# Patient Record
Sex: FEMALE | Race: OTHER | NOT HISPANIC OR LATINO | URBAN - METROPOLITAN AREA
[De-identification: names, ages, dates, MRNs, and addresses within clinical notes are randomized per-mention and may not be internally consistent; named-entity substitution may affect disease eponyms.]

---

## 2020-08-25 ENCOUNTER — OFFICE VISIT (OUTPATIENT)
Dept: URBAN - METROPOLITAN AREA TELEHEALTH 2 | Facility: TELEHEALTH | Age: 65
End: 2020-08-25
Payer: MEDICARE

## 2020-08-25 DIAGNOSIS — K52.9 CHRONIC DIARRHEA: ICD-10-CM

## 2020-08-25 PROBLEM — 62315008 DIARRHEA: Status: ACTIVE | Noted: 2020-08-25

## 2020-08-25 PROCEDURE — 99214 OFFICE O/P EST MOD 30 MIN: CPT | Performed by: INTERNAL MEDICINE

## 2020-08-25 PROCEDURE — 3017F COLORECTAL CA SCREEN DOC REV: CPT | Performed by: INTERNAL MEDICINE

## 2020-08-25 PROCEDURE — G8427 DOCREV CUR MEDS BY ELIG CLIN: HCPCS | Performed by: INTERNAL MEDICINE

## 2020-08-25 PROCEDURE — G8422 PT INELIG BMI CALCULATION: HCPCS | Performed by: INTERNAL MEDICINE

## 2020-08-25 PROCEDURE — 1036F TOBACCO NON-USER: CPT | Performed by: INTERNAL MEDICINE

## 2020-08-25 PROCEDURE — G9903 PT SCRN TBCO ID AS NON USER: HCPCS | Performed by: INTERNAL MEDICINE

## 2020-08-25 NOTE — HPI-TODAY'S VISIT:
65 yo pt w/ 10 days of persistent diarrhea: loose to watery stools w/o blood nor mucus, 3 to 4 x /d, w/o abdominal pain, some diffuse abdominal tenderness w/ palpation, unrelated to the type of food she eats, and no nocturnal diarrhea. No steatorrhea. Has lost ~ 5 lbs / 2 wks due to decreased po intake. No change in diet and no use of abx's recently. Has been under stress at home lately. No fever or chills and denies constitutional sxs.  Has been on probiotics. She also admits having a bitter taste in her mouth in the am. No nicholas LEXI sxs and no dysphagia / odynophagia. No other complaints afer extensive ROS x for above noted. She had similar episode of diarrhea for some time last year w/ negative evaluation. No recent labs and has been on no PPI.

## 2021-06-22 ENCOUNTER — OFFICE VISIT (OUTPATIENT)
Dept: URBAN - METROPOLITAN AREA TELEHEALTH 2 | Facility: TELEHEALTH | Age: 66
End: 2021-06-22
Payer: MEDICARE

## 2021-06-22 DIAGNOSIS — K21.9 GERD WITHOUT ESOPHAGITIS: ICD-10-CM

## 2021-06-22 PROCEDURE — 99213 OFFICE O/P EST LOW 20 MIN: CPT | Performed by: INTERNAL MEDICINE

## 2021-06-22 RX ORDER — PANTOPRAZOLE SODIUM 40 MG/1
1 TABLET TABLET, DELAYED RELEASE ORAL ONCE A DAY
Qty: 30 | Refills: 3 | OUTPATIENT
Start: 2021-06-22

## 2021-06-22 NOTE — HPI-TODAY'S VISIT:
This is a Telehealth OV to which patient has agreed to. Limitations of telehealth discussed; she understands and agrees to proceed. 64 yo pt w/ increasing LEXI sxs past several weeks. No abdominal pain and no dysphagia / odynophagia. Has been experiencing p-prandial palpitations w/o CP nor SOB. On no RX. No anorexia or weight loss. Normal bm's w/o bleeding nor melenic stools. Some esophageal spasm when taking Omega-3 supplements. No COVID-19 exposure and has received 2 doses of COVID-19 vaccine.

## 2021-12-04 ENCOUNTER — OFFICE VISIT (OUTPATIENT)
Dept: URBAN - METROPOLITAN AREA TELEHEALTH 2 | Facility: TELEHEALTH | Age: 66
End: 2021-12-04
Payer: MEDICARE

## 2021-12-04 DIAGNOSIS — K21.9 GERD WITHOUT ESOPHAGITIS: ICD-10-CM

## 2021-12-04 PROCEDURE — 99214 OFFICE O/P EST MOD 30 MIN: CPT | Performed by: INTERNAL MEDICINE

## 2021-12-04 RX ORDER — LANSOPRAZOLE 30 MG
1 CAPSULE BEFORE A MEAL CAPSULE,DELAYED RELEASE (ENTERIC COATED) ORAL ONCE A DAY
Qty: 30 | Refills: 3 | OUTPATIENT
Start: 2021-12-04

## 2021-12-04 RX ORDER — PANTOPRAZOLE SODIUM 40 MG/1
1 TABLET TABLET, DELAYED RELEASE ORAL ONCE A DAY
Qty: 30 | Refills: 3 | Status: ACTIVE | COMMUNITY
Start: 2021-06-22

## 2021-12-04 NOTE — HPI-TODAY'S VISIT:
This is a Telehealth OV to which patient has agreed to. Limitations of telehealth discussed; she understands and agrees to proceed. 67 yo pt w chronic GERD here for evaluation of 3 weeks of sore throat, hoarseness, w subxiphoid pain w radiation to the back and globus sesation. Seen by her PCP: DX: Pharyngitis and Rx'd w abx. No improvement in sxs. Still w p-prandial hoarseness, globus sesation w/o heartburn  / regurgitation nor dusphagia or odynophagia. No fever or chills, No cardiorespiratory sxs  EGD 10/19: NERD , HH Hp-negative reactive gastritis an normal duodenal bx's. She has been experiencing p-prandial palpitations at times, wo CP nor SOB. She had stopped Pantoprazole recently.  On no RX. No anorexia or weight loss. Normal bm's w/o bleeding nor melenic stools. Some esophageal spasm when taking Omega-3 supplements. No COVID-19 exposure and has received 2 doses of COVID-19 vaccine. NO other complaints.

## 2022-03-26 ENCOUNTER — OFFICE VISIT (OUTPATIENT)
Dept: URBAN - METROPOLITAN AREA TELEHEALTH 2 | Facility: TELEHEALTH | Age: 67
End: 2022-03-26
Payer: MEDICARE

## 2022-03-26 DIAGNOSIS — K21.9 ACID REFLUX: ICD-10-CM

## 2022-03-26 PROCEDURE — 993 APS NON BILLABLE: Performed by: INTERNAL MEDICINE

## 2022-03-26 RX ORDER — LANSOPRAZOLE 30 MG
1 CAPSULE BEFORE A MEAL CAPSULE,DELAYED RELEASE (ENTERIC COATED) ORAL ONCE A DAY
Qty: 30 | Refills: 3 | Status: ACTIVE | COMMUNITY
Start: 2021-12-04

## 2022-03-26 RX ORDER — PANTOPRAZOLE SODIUM 40 MG/1
1 TABLET TABLET, DELAYED RELEASE ORAL ONCE A DAY
Qty: 30 | Refills: 3 | Status: ACTIVE | COMMUNITY
Start: 2021-06-22

## 2022-04-23 ENCOUNTER — WEB ENCOUNTER (OUTPATIENT)
Dept: URBAN - METROPOLITAN AREA TELEHEALTH 2 | Facility: TELEHEALTH | Age: 67
End: 2022-04-23

## 2022-04-23 ENCOUNTER — OFFICE VISIT (OUTPATIENT)
Dept: URBAN - METROPOLITAN AREA TELEHEALTH 2 | Facility: TELEHEALTH | Age: 67
End: 2022-04-23
Payer: MEDICARE

## 2022-04-23 DIAGNOSIS — K21.9 GERD WITHOUT ESOPHAGITIS: ICD-10-CM

## 2022-04-23 PROCEDURE — 99214 OFFICE O/P EST MOD 30 MIN: CPT | Performed by: INTERNAL MEDICINE

## 2022-04-23 NOTE — HPI-TODAY'S VISIT:
This is a Telehealth OV to which patient has agreed to. Limitations of telehealth discussed; she understands and agrees to proceed. Patient's @ home during this virtual OV. 67 yo pt  for follow up of  chronic GERD. She continues to have  sore throat, hoarseness,  dry cough, w subxiphoid pain w radiation to the interscapular area and globus sensation. Pantoprazole ineffective ; now off Rx. No dysphagia or odynophagia. She reports occasional p-prandial chills and palpitations wo CP or respiratory sxs. She reports taking several supplements ( Ca, omega-3, Mg, Coq-10, Ocuvite, Biotin, Ginko biloba, probiotic, collagen, turmeric ) all together at night.  EGD 10/19: NERD , HH Hp-negative reactive gastritis an normal duodenal bx's.  No anorexia or weight loss. Normal bm's w/o bleeding nor melenic stools. Some esophageal spasm when taking Omega-3 supplements. No COVID-19 exposure and has received 2 doses of COVID-19 vaccine. NO other complaints. She's schedle for XTT / Echo w Cardiology in a couple of weeks.

## 2022-08-05 ENCOUNTER — OFFICE VISIT (OUTPATIENT)
Dept: URBAN - METROPOLITAN AREA LAB 3 | Facility: LAB | Age: 67
End: 2022-08-05
Payer: MEDICARE

## 2022-08-05 DIAGNOSIS — K21.00 ALKALINE REFLUX ESOPHAGITIS: ICD-10-CM

## 2022-08-05 DIAGNOSIS — K29.60 ADENOPAPILLOMATOSIS GASTRICA: ICD-10-CM

## 2022-08-05 DIAGNOSIS — K22.89 DILATATION OF ESOPHAGUS: ICD-10-CM

## 2022-08-05 PROCEDURE — 43239 EGD BIOPSY SINGLE/MULTIPLE: CPT | Performed by: INTERNAL MEDICINE

## 2022-08-24 ENCOUNTER — TELEPHONE ENCOUNTER (OUTPATIENT)
Dept: URBAN - METROPOLITAN AREA CLINIC 111 | Facility: CLINIC | Age: 67
End: 2022-08-24

## 2022-08-24 RX ORDER — PANTOPRAZOLE SODIUM 40 MG/1
1 TABLET TABLET, DELAYED RELEASE ORAL ONCE A DAY
Qty: 90 | Refills: 3 | OUTPATIENT
Start: 2022-08-25

## 2022-08-29 ENCOUNTER — TELEPHONE ENCOUNTER (OUTPATIENT)
Dept: URBAN - METROPOLITAN AREA CLINIC 6 | Facility: CLINIC | Age: 67
End: 2022-08-29

## 2022-09-17 ENCOUNTER — OFFICE VISIT (OUTPATIENT)
Dept: URBAN - METROPOLITAN AREA TELEHEALTH 2 | Facility: TELEHEALTH | Age: 67
End: 2022-09-17
Payer: MEDICARE

## 2022-09-17 DIAGNOSIS — K21.9 GERD WITHOUT ESOPHAGITIS: ICD-10-CM

## 2022-09-17 PROCEDURE — 99214 OFFICE O/P EST MOD 30 MIN: CPT | Performed by: INTERNAL MEDICINE

## 2022-09-17 RX ORDER — FAMOTIDINE 40 MG/1
1 TABLET AT BEDTIME TABLET, FILM COATED ORAL ONCE A DAY
Qty: 90 | Refills: 3 | OUTPATIENT
Start: 2022-09-17

## 2022-09-17 RX ORDER — PANTOPRAZOLE SODIUM 40 MG/1
1 TABLET TABLET, DELAYED RELEASE ORAL ONCE A DAY
Qty: 90 | Refills: 3 | Status: ACTIVE | COMMUNITY
Start: 2022-08-25

## 2022-09-17 NOTE — HPI-TODAY'S VISIT:
This is a Telehealth OV to which patient has agreed to. Limitations of telehealth discussed; she understands and agrees to proceed. Patient's @ home during this virtual OV. 65 yo pt  for follow up of  chronic GERD. She developed abdominal pain from Protonix and has stopped it. Has been controlling LEXI sxs w Aloe vera juice and Aple cider vinegar. She has no dysphagia / odynophagia and no abdominal pain. EGD 8/22: NERD, sm HH, Hp-negative chronic gastritis and normal duodenal bx's.  Still w some p-prandial fullness. She has been seen by her PCP and Rx'd Elavil for diffus arthralgias which she hasn't started as yet. No anorexia or weight loss. Normal bm's w/o bleeding nor melenic stools. Some esophageal spasm when she was taking Omega-3 supplements, off now. No COVID-19 exposure and has received 2 doses of COVID-19 vaccine. NO other complaints.

## 2023-01-28 ENCOUNTER — OFFICE VISIT (OUTPATIENT)
Dept: URBAN - METROPOLITAN AREA TELEHEALTH 2 | Facility: TELEHEALTH | Age: 68
End: 2023-01-28
Payer: MEDICARE

## 2023-01-28 ENCOUNTER — TELEPHONE ENCOUNTER (OUTPATIENT)
Dept: URBAN - METROPOLITAN AREA CLINIC 92 | Facility: CLINIC | Age: 68
End: 2023-01-28

## 2023-01-28 DIAGNOSIS — K21.9 GERD WITHOUT ESOPHAGITIS: ICD-10-CM

## 2023-01-28 DIAGNOSIS — K57.90 DIVERTICULOSIS: ICD-10-CM

## 2023-01-28 PROBLEM — 397881000: Status: ACTIVE | Noted: 2023-01-28

## 2023-01-28 PROBLEM — 266435005: Status: ACTIVE | Noted: 2021-06-22

## 2023-01-28 PROCEDURE — 99214 OFFICE O/P EST MOD 30 MIN: CPT | Performed by: INTERNAL MEDICINE

## 2023-01-28 RX ORDER — PANTOPRAZOLE SODIUM 40 MG/1
1 TABLET TABLET, DELAYED RELEASE ORAL ONCE A DAY
Qty: 90 | Refills: 3 | Status: ACTIVE | COMMUNITY
Start: 2022-08-25

## 2023-01-28 RX ORDER — PANTOPRAZOLE SODIUM 40 MG/1
1 TABLET TABLET, DELAYED RELEASE ORAL ONCE A DAY
Qty: 90 | Refills: 3 | OUTPATIENT
Start: 2023-01-28

## 2023-01-28 RX ORDER — FAMOTIDINE 40 MG/1
1 TABLET AT BEDTIME TABLET, FILM COATED ORAL ONCE A DAY
Qty: 90 | Refills: 3 | Status: ACTIVE | COMMUNITY
Start: 2022-09-17

## 2023-01-28 RX ORDER — FAMOTIDINE 40 MG/1
1 TABLET AT BEDTIME TABLET, FILM COATED ORAL ONCE A DAY
Qty: 90 | Refills: 3 | OUTPATIENT

## 2023-01-28 NOTE — HPI-TODAY'S VISIT:
This is a Telehealth OV to which patient has agreed to. Limitations of telehealth discussed; she understands and agrees to proceed. Patient's @ home during this virtual OV. 68 yo pt  for follow up of  chronic GERD. Has been controlling LEXI sxs w Aloe vera juice and Aple cider vinega, lemon juice in am and Protonix qd. She has no dysphagia / odynophagia and no abdominal pain. She does c/o p-prandial bloating, fullness, bloating and fatigue. Has been Dx'd w LS-OA / sciatica, has been advised to start Neurontin, but she hasn't started it as yet. Seen by Cardiology for palpitations: normal evaluation. EGD 8/22: NERD, sm HH, Hp-negative chronic gastritis and normal duodenal bx's.  Colonoscopy 2029: L-diverticulosis.Still w some p-prandial fullness. She has been seen by her PCP and Rx'd Elavil for diffuse arthralgias which she hasn't started as yet. No anorexia or weight loss. Normal bm's w/o bleeding nor melenic stools. No COVID-19 exposure and has received 2 doses of COVID-19 vaccine. NO other complaints.

## 2023-10-17 ENCOUNTER — OFFICE VISIT (OUTPATIENT)
Dept: URBAN - METROPOLITAN AREA TELEHEALTH 2 | Facility: TELEHEALTH | Age: 68
End: 2023-10-17

## 2023-11-10 ENCOUNTER — OFFICE VISIT (OUTPATIENT)
Dept: URBAN - METROPOLITAN AREA TELEHEALTH 2 | Facility: TELEHEALTH | Age: 68
End: 2023-11-10
Payer: MEDICARE

## 2023-11-10 DIAGNOSIS — K57.90 DIVERTICULOSIS: ICD-10-CM

## 2023-11-10 DIAGNOSIS — K21.9 GERD WITHOUT ESOPHAGITIS: ICD-10-CM

## 2023-11-10 PROCEDURE — 99215 OFFICE O/P EST HI 40 MIN: CPT | Performed by: INTERNAL MEDICINE

## 2023-11-10 RX ORDER — PANTOPRAZOLE SODIUM 40 MG/1
1 TABLET TABLET, DELAYED RELEASE ORAL ONCE A DAY
Qty: 90 | Refills: 3 | Status: ACTIVE | COMMUNITY
Start: 2023-01-28

## 2023-11-10 RX ORDER — FAMOTIDINE 40 MG/1
1 TABLET AT BEDTIME TABLET, FILM COATED ORAL ONCE A DAY
Qty: 90 | Refills: 3 | OUTPATIENT

## 2023-11-10 RX ORDER — FAMOTIDINE 40 MG/1
1 TABLET AT BEDTIME TABLET, FILM COATED ORAL ONCE A DAY
Qty: 90 | Refills: 3 | Status: ACTIVE | COMMUNITY

## 2023-11-10 RX ORDER — PANTOPRAZOLE SODIUM 40 MG/1
1 TABLET TABLET, DELAYED RELEASE ORAL ONCE A DAY
Qty: 90 | Refills: 3 | Status: ACTIVE | COMMUNITY
Start: 2022-08-25

## 2023-11-10 NOTE — HPI-TODAY'S VISIT:
This is a Telehealth OV to which patient has agreed to. Limitations of telehealth discussed; she understands and agrees to proceed. Patient's @ home during this virtual OV. 69 yo pt  for follow up of  chronic GERD. Has been controlling LEXI sxs w Aloe vera juice and Apple cider vinegar, lemon juice in am and Protonix qd. Lately, she has been off Pantoprazole due to concerns about potential prolonged use of PPI. She has c/o p-prandial dyspepsia, pharyngeal phlegm, dry cough, nocturnal bloating, regurgitation and heartburn. Denies dysphagia  / odynophagia and no melenic stools.  She does c/o p-prandial bloating, fullness, bloating and fatigue. Has been Dx'd w LS-OA / sciatica, has been advised to start Neurontin, but she hasn't started it as yet. Seen by Cardiology for palpitations: normal evaluation. EGD 8/22: NERD, sm HH, Hp-negative chronic gastritis and normal duodenal bx's.  Colonoscopy 2029: L-diverticulosis. Normal bm's w/o bleeding nor melenic stools. No COVID-19 exposure and has received 2 doses of COVID-19 vaccine. NO other complaints.

## 2024-01-19 ENCOUNTER — OFFICE VISIT (OUTPATIENT)
Dept: URBAN - METROPOLITAN AREA CLINIC 111 | Facility: CLINIC | Age: 69
End: 2024-01-19
Payer: MEDICARE

## 2024-01-19 VITALS
HEIGHT: 62 IN | DIASTOLIC BLOOD PRESSURE: 80 MMHG | WEIGHT: 146.2 LBS | BODY MASS INDEX: 26.91 KG/M2 | TEMPERATURE: 97.2 F | HEART RATE: 74 BPM | SYSTOLIC BLOOD PRESSURE: 147 MMHG

## 2024-01-19 DIAGNOSIS — K21.9 GERD WITHOUT ESOPHAGITIS: ICD-10-CM

## 2024-01-19 DIAGNOSIS — K57.90 DIVERTICULOSIS: ICD-10-CM

## 2024-01-19 PROCEDURE — 99214 OFFICE O/P EST MOD 30 MIN: CPT | Performed by: INTERNAL MEDICINE

## 2024-01-19 RX ORDER — PANTOPRAZOLE SODIUM 40 MG/1
1 TABLET TABLET, DELAYED RELEASE ORAL ONCE A DAY
Qty: 90 TABLET | Refills: 3 | OUTPATIENT
Start: 2024-01-22

## 2024-01-19 RX ORDER — FAMOTIDINE 40 MG/1
1 TABLET AT BEDTIME TABLET, FILM COATED ORAL ONCE A DAY
Qty: 90 | Refills: 3 | Status: ACTIVE | COMMUNITY

## 2024-01-19 RX ORDER — PANTOPRAZOLE SODIUM 40 MG/1
1 TABLET TABLET, DELAYED RELEASE ORAL ONCE A DAY
Qty: 90 | Refills: 3 | Status: ACTIVE | COMMUNITY
Start: 2022-08-25

## 2024-01-19 RX ORDER — PANTOPRAZOLE SODIUM 40 MG/1
1 TABLET TABLET, DELAYED RELEASE ORAL ONCE A DAY
Qty: 90 | Refills: 3 | Status: ACTIVE | COMMUNITY
Start: 2023-01-28

## 2024-01-19 NOTE — HPI-TODAY'S VISIT:
67 yo pt  for follow up of  chronic GERD. Has been controlling LEXI sxs w Aloe vera juice and Apple cider vinegar, lemon juice in am and Protonix qd on -demand. Lately, she has been off Pantoprazole due to concerns about potential prolonged use of PPI. She has c/o p-prandial, after dinner dyspepsia, pharyngeal phlegm, dry cough, bloating, gas, regurgitation and heartburn. Denies dysphagia  / odynophagia and no melenic stools. Has gained some weight lately. Has been Dx'd w LS-OA / sciatica, has been advised to start Neurontin, but she hasn't started it as yet. Seen by Cardiology for palpitations: normal evaluation. EGD 8/22: NERD, sm HH, Hp-negative chronic gastritis and normal duodenal bx's.  Colonoscopy 2029: L-diverticulosis. Normal bm's w/o bleeding nor melenic stools. No COVID-19 exposure and has received 2 doses of COVID-19 vaccine. NO other complaints.

## 2024-02-22 ENCOUNTER — LAB (OUTPATIENT)
Dept: URBAN - METROPOLITAN AREA CLINIC 98 | Facility: CLINIC | Age: 69
End: 2024-02-22

## 2024-05-03 ENCOUNTER — OFFICE VISIT (OUTPATIENT)
Dept: URBAN - METROPOLITAN AREA TELEHEALTH 2 | Facility: TELEHEALTH | Age: 69
End: 2024-05-03
Payer: MEDICARE

## 2024-05-03 DIAGNOSIS — K21.9 GERD WITHOUT ESOPHAGITIS: ICD-10-CM

## 2024-05-03 DIAGNOSIS — A08.4 VIRAL GASTROENTERITIS: ICD-10-CM

## 2024-05-03 DIAGNOSIS — R53.81 OTHER MALAISE: ICD-10-CM

## 2024-05-03 DIAGNOSIS — R53.83 OTHER FATIGUE: ICD-10-CM

## 2024-05-03 PROCEDURE — 99214 OFFICE O/P EST MOD 30 MIN: CPT | Performed by: INTERNAL MEDICINE

## 2024-05-03 RX ORDER — PANTOPRAZOLE SODIUM 40 MG/1
1 TABLET TABLET, DELAYED RELEASE ORAL ONCE A DAY
Qty: 90 TABLET | Refills: 3 | Status: ACTIVE | COMMUNITY
Start: 2024-01-22

## 2024-05-03 RX ORDER — PANTOPRAZOLE SODIUM 40 MG/1
1 TABLET TABLET, DELAYED RELEASE ORAL ONCE A DAY
Qty: 90 | Refills: 3 | Status: ACTIVE | COMMUNITY
Start: 2023-01-28

## 2024-05-03 RX ORDER — PANTOPRAZOLE SODIUM 40 MG/1
1 TABLET TABLET, DELAYED RELEASE ORAL ONCE A DAY
Qty: 90 | Refills: 3 | Status: ACTIVE | COMMUNITY
Start: 2022-08-25

## 2024-05-03 RX ORDER — FAMOTIDINE 40 MG/1
1 TABLET AT BEDTIME TABLET, FILM COATED ORAL ONCE A DAY
Qty: 90 | Refills: 3 | Status: ACTIVE | COMMUNITY

## 2024-05-06 ENCOUNTER — DASHBOARD ENCOUNTERS (OUTPATIENT)
Age: 69
End: 2024-05-06

## 2024-05-13 ENCOUNTER — TELEPHONE ENCOUNTER (OUTPATIENT)
Dept: URBAN - METROPOLITAN AREA CLINIC 98 | Facility: CLINIC | Age: 69
End: 2024-05-13

## 2024-05-17 LAB
A/G RATIO: 2
ALBUMIN: 4.3
ALKALINE PHOSPHATASE: 53
ALT (SGPT): 18
AST (SGOT): 24
BILIRUBIN, TOTAL: 0.6
BUN/CREATININE RATIO: 27
BUN: 22
C-REACTIVE PROTEIN, QUANT: <1
CALCIUM: 9.7
CARBON DIOXIDE, TOTAL: 20
CHLORIDE: 105
CORTISOL - AM: 12.8
CREATININE: 0.81
DEAMIDATED GLIADIN ABS, IGA: 5
DEAMIDATED GLIADIN ABS, IGG: 2
EGFR: 79
ENDOMYSIAL ANTIBODY IGA: NEGATIVE
GLOBULIN, TOTAL: 2.1
GLUCOSE: 96
HEMATOCRIT: 40.4
HEMOGLOBIN: 12.8
IMMUNOGLOBULIN A, QN, SERUM: 162
MCH: 30
MCHC: 31.7
MCV: 95
NRBC: (no result)
PLATELETS: 276
POTASSIUM: 5
PROTEIN, TOTAL: 6.4
RBC: 4.27
RDW: 13
SEDIMENTATION RATE-WESTERGREN: 2
SODIUM: 141
T-TRANSGLUTAMINASE (TTG) IGA: <2
T-TRANSGLUTAMINASE (TTG) IGG: <2
T4,FREE(DIRECT): 1.12
TSH: 3.01
WBC: 4.6

## 2024-06-10 ENCOUNTER — OFFICE VISIT (OUTPATIENT)
Dept: URBAN - METROPOLITAN AREA TELEHEALTH 2 | Facility: TELEHEALTH | Age: 69
End: 2024-06-10
Payer: MEDICARE

## 2024-06-10 DIAGNOSIS — K21.9 GERD WITHOUT ESOPHAGITIS: ICD-10-CM

## 2024-06-10 DIAGNOSIS — R53.83 OTHER FATIGUE: ICD-10-CM

## 2024-06-10 PROCEDURE — 99214 OFFICE O/P EST MOD 30 MIN: CPT | Performed by: INTERNAL MEDICINE

## 2024-06-10 RX ORDER — PANTOPRAZOLE SODIUM 40 MG/1
1 TABLET TABLET, DELAYED RELEASE ORAL ONCE A DAY
Qty: 90 TABLET | Refills: 3 | Status: ACTIVE | COMMUNITY
Start: 2024-01-22

## 2024-06-10 RX ORDER — PANTOPRAZOLE SODIUM 40 MG/1
1 TABLET TABLET, DELAYED RELEASE ORAL ONCE A DAY
Qty: 90 | Refills: 3 | Status: ACTIVE | COMMUNITY
Start: 2023-01-28

## 2024-06-10 RX ORDER — PANTOPRAZOLE SODIUM 40 MG/1
1 TABLET TABLET, DELAYED RELEASE ORAL ONCE A DAY
Qty: 90 | Refills: 3 | Status: ACTIVE | COMMUNITY
Start: 2022-08-25

## 2024-06-10 RX ORDER — FAMOTIDINE 40 MG/1
1 TABLET AT BEDTIME TABLET, FILM COATED ORAL ONCE A DAY
Qty: 90 | Refills: 3 | Status: ACTIVE | COMMUNITY

## 2024-06-10 NOTE — HPI-TODAY'S VISIT:
This is a Telehealth OV to which patient has agreed to. Limitations of TH discussed; patient understands and agrees to proceed. Pt's at home during this virtual OV. 69 yo pt  for follow up of  chronic GERD. Has been controlling LEXI sxs w Aloe vera juice, Apple cider vinegar, lemon juice in am and Protonix qd on -demand. Today, she has c/o fatigue, tiredness, intermittent palpitations, thick saliva wo nicholas GERD sxs off PPI; less LEXI sxs on Pantoprazole 40 mg po qd.  NO fever, chills, night sweats and no weight loss. Denies dysphagia  / odynophagia and no melenic stools. Has gained some weight lately, due to sedentary lifestyle.  Has been Dx'd w LS-OA / sciatica on PT. Labs 5/24: all normal. Normal  Cardiology evaluation. EGD 8/22: NERD, sm HH, Hp-negative chronic gastritis and normal duodenal bx's.  Colonoscopy 2029: L-diverticulosis. Normal bm's w/o bleeding nor melenic stools. No recent labs. NO other complaints.

## 2024-10-18 ENCOUNTER — TELEPHONE ENCOUNTER (OUTPATIENT)
Dept: URBAN - METROPOLITAN AREA CLINIC 111 | Facility: CLINIC | Age: 69
End: 2024-10-18

## 2024-10-18 RX ORDER — SODIUM, POTASSIUM,MAG SULFATES 17.5-3.13G
354ML SOLUTION, RECONSTITUTED, ORAL ORAL
Qty: 345 MILLILITER | Refills: 0 | OUTPATIENT
Start: 2024-10-25 | End: 2024-10-26

## 2024-10-25 ENCOUNTER — LAB OUTSIDE AN ENCOUNTER (OUTPATIENT)
Dept: URBAN - METROPOLITAN AREA CLINIC 111 | Facility: CLINIC | Age: 69
End: 2024-10-25

## 2024-10-25 PROBLEM — 235595009: Status: ACTIVE | Noted: 2024-10-25

## 2024-12-03 ENCOUNTER — OFFICE VISIT (OUTPATIENT)
Dept: URBAN - METROPOLITAN AREA TELEHEALTH 2 | Facility: TELEHEALTH | Age: 69
End: 2024-12-03
Payer: MEDICARE

## 2024-12-03 ENCOUNTER — LAB OUTSIDE AN ENCOUNTER (OUTPATIENT)
Dept: URBAN - METROPOLITAN AREA TELEHEALTH 2 | Facility: TELEHEALTH | Age: 69
End: 2024-12-03

## 2024-12-03 DIAGNOSIS — K21.9 CHRONIC GERD: ICD-10-CM

## 2024-12-03 DIAGNOSIS — Z12.11 COLON CANCER SCREENING: ICD-10-CM

## 2024-12-03 PROCEDURE — 99214 OFFICE O/P EST MOD 30 MIN: CPT | Performed by: INTERNAL MEDICINE

## 2024-12-03 RX ORDER — PANTOPRAZOLE SODIUM 40 MG/1
1 TABLET TABLET, DELAYED RELEASE ORAL ONCE A DAY
Qty: 90 | Refills: 3 | Status: ACTIVE | COMMUNITY
Start: 2023-01-28

## 2024-12-03 RX ORDER — SODIUM, POTASSIUM,MAG SULFATES 17.5-3.13G
354ML SOLUTION, RECONSTITUTED, ORAL ORAL
Qty: 345 MILLILITER | Refills: 0 | OUTPATIENT
Start: 2024-12-03 | End: 2024-12-04

## 2024-12-03 RX ORDER — FAMOTIDINE 40 MG/1
1 TABLET AT BEDTIME TABLET, FILM COATED ORAL ONCE A DAY
Qty: 90 | Refills: 3 | Status: ACTIVE | COMMUNITY

## 2024-12-03 RX ORDER — PANTOPRAZOLE SODIUM 40 MG/1
1 TABLET TABLET, DELAYED RELEASE ORAL ONCE A DAY
Qty: 90 TABLET | Refills: 3 | Status: ACTIVE | COMMUNITY
Start: 2024-01-22

## 2024-12-03 RX ORDER — PANTOPRAZOLE SODIUM 40 MG/1
1 TABLET TABLET, DELAYED RELEASE ORAL ONCE A DAY
Qty: 90 | Refills: 3 | Status: ACTIVE | COMMUNITY
Start: 2022-08-25

## 2024-12-03 RX ORDER — PANTOPRAZOLE SODIUM 40 MG/1
1 TABLET TABLET, DELAYED RELEASE ORAL ONCE A DAY
Qty: 90 TABLET | Refills: 3 | OUTPATIENT
Start: 2024-12-03

## 2024-12-03 NOTE — HPI-TODAY'S VISIT:
This is a Telehealth OV to which patient has agreed to. Limitations of TH discussed; patient understands and agrees to proceed. Pt's at home during this virtual OV. 70 yo pt  for follow up of  chronic GERD. Has been controlling LEXI sxs w Aloe vera juice, Apple cider vinegar, lemon juice in am and Protonix qd on -demand. Today, she has c/o fatigue, tiredness, intermittent palpitations. thick saliva mostly at night, wo nicholas GERD sxs off PPI; less LEXI sxs on Pantoprazole 40 mg po qd. Occasional bitter taste in her mouth. Increase GERD off Pantoprazole. NO fever, chills, night sweats and no weight loss. Denies dysphagia  / odynophagia and no melenic stools. Has gained  weight lately, due to sedentary lifestyle.Very anxious.  Has been Dx'd w LS-OA / sciatica on PT, s/p epidural injection,w minimal improvement.  Labs 5/24: all normal. Normal  Cardiology evaluation. EGD 8/22: NERD, sm HH, Hp-negative chronic gastritis and normal duodenal bx's.  Colonoscopy 2029: L-diverticulosis. Normal bm's w/o bleeding nor melenic stools. No recent labs. NO other complaints.

## 2024-12-11 ENCOUNTER — TELEPHONE ENCOUNTER (OUTPATIENT)
Dept: URBAN - METROPOLITAN AREA CLINIC 98 | Facility: CLINIC | Age: 69
End: 2024-12-11

## 2025-01-10 ENCOUNTER — OFFICE VISIT (OUTPATIENT)
Dept: URBAN - METROPOLITAN AREA LAB 3 | Facility: LAB | Age: 70
End: 2025-01-10

## 2025-02-14 ENCOUNTER — OFFICE VISIT (OUTPATIENT)
Dept: URBAN - METROPOLITAN AREA LAB 3 | Facility: LAB | Age: 70
End: 2025-02-14

## 2025-04-23 ENCOUNTER — OFFICE VISIT (OUTPATIENT)
Dept: URBAN - METROPOLITAN AREA TELEHEALTH 2 | Facility: TELEHEALTH | Age: 70
End: 2025-04-23
Payer: MEDICARE

## 2025-04-23 DIAGNOSIS — K21.9 CHRONIC GERD: ICD-10-CM

## 2025-04-23 PROCEDURE — 99214 OFFICE O/P EST MOD 30 MIN: CPT | Performed by: INTERNAL MEDICINE

## 2025-04-23 RX ORDER — PANTOPRAZOLE SODIUM 40 MG/1
1 TABLET TABLET, DELAYED RELEASE ORAL ONCE A DAY
Qty: 90 TABLET | Refills: 3 | Status: ACTIVE | COMMUNITY
Start: 2024-12-03

## 2025-04-23 RX ORDER — PANTOPRAZOLE SODIUM 40 MG/1
1 TABLET TABLET, DELAYED RELEASE ORAL ONCE A DAY
Qty: 90 TABLET | Refills: 3 | Status: ACTIVE | COMMUNITY
Start: 2024-01-22

## 2025-04-23 RX ORDER — PANTOPRAZOLE SODIUM 40 MG/1
1 TABLET TABLET, DELAYED RELEASE ORAL ONCE A DAY
Qty: 90 | Refills: 3 | Status: ACTIVE | COMMUNITY
Start: 2022-08-25

## 2025-04-23 RX ORDER — PANTOPRAZOLE SODIUM 40 MG/1
1 TABLET TABLET, DELAYED RELEASE ORAL ONCE A DAY
Qty: 90 TABLET | Refills: 3 | OUTPATIENT
Start: 2025-04-23

## 2025-04-23 RX ORDER — PANTOPRAZOLE SODIUM 40 MG/1
1 TABLET TABLET, DELAYED RELEASE ORAL ONCE A DAY
Qty: 90 | Refills: 3 | Status: ACTIVE | COMMUNITY
Start: 2023-01-28

## 2025-04-23 RX ORDER — FAMOTIDINE 40 MG/1
1 TABLET AT BEDTIME TABLET, FILM COATED ORAL ONCE A DAY
Qty: 90 | Refills: 3 | Status: ACTIVE | COMMUNITY

## 2025-04-23 NOTE — HPI-TODAY'S VISIT:
This is a Telehealth OV to which patient has agreed to. Limitations of TH discussed; patient understands and agrees to proceed. Pt's at home during this virtual OV. 68 yo pt  for follow up of  chronic GERD. EGD 2/25: NERD, sm HH, Hp-negative chronic inactive gastritis and normal duodenal bx's. Colonoscopy 2/25: L-diverticulosis w sigmoid spasm and I - E Hrrds.  Has been controlling LEXI sxs w Aloe vera juice, apple cider vinegar, lemon juice in am and Protonix 40 mg po qd, off Famotidine. Today, she has c/o pharyngeal phlegm, throat clearing, dry cough and  thick saliva mostly at night, wo nicholas GERD, and less LEXI sxs on Pantoprazole 40 mg po qd. Occasional bitter taste in her mouth. Increase GERD off Pantoprazole. NO fever, chills, night sweats and no weight loss. Denies dysphagia  / odynophagia and no melenic stools. Has gained  weight lately, due to sedentary lifestyle.Very anxious.  Has been Dx'd w LS-OA / sciatica on PT, s/p epidural injection,w minimal improvement. Symptomatic OA on Gabapentin / Tylenol.   Labs 5/24: all normal. Normal  Cardiology evaluation. EGD 8/22: NERD, sm HH, Hp-negative chronic gastritis and normal duodenal bx's.  Colonoscopy 2029: L-diverticulosis. Normal bm's w/o bleeding nor melenic stools.NO other complaints.